# Patient Record
Sex: MALE | URBAN - METROPOLITAN AREA
[De-identification: names, ages, dates, MRNs, and addresses within clinical notes are randomized per-mention and may not be internally consistent; named-entity substitution may affect disease eponyms.]

---

## 2019-07-10 ENCOUNTER — OFFICE VISIT (OUTPATIENT)
Dept: URBAN - METROPOLITAN AREA CLINIC 32 | Facility: CLINIC | Age: 44
End: 2019-07-10
Payer: COMMERCIAL

## 2019-07-10 PROCEDURE — 99203 OFFICE O/P NEW LOW 30 MIN: CPT | Performed by: OPTOMETRIST

## 2019-07-12 ENCOUNTER — OFFICE VISIT (OUTPATIENT)
Dept: URBAN - METROPOLITAN AREA CLINIC 33 | Facility: CLINIC | Age: 44
End: 2019-07-12
Payer: COMMERCIAL

## 2019-07-12 DIAGNOSIS — H10.13 ACUTE ATOPIC CONJUNCTIVITIS, BILATERAL: Primary | ICD-10-CM

## 2019-07-12 PROCEDURE — 99213 OFFICE O/P EST LOW 20 MIN: CPT | Performed by: OPTOMETRIST

## 2019-07-12 RX ORDER — PREDNISOLONE ACETATE 10 MG/ML
1 % SUSPENSION/ DROPS OPHTHALMIC
Qty: 5 | Refills: 1 | Status: ACTIVE
Start: 2019-07-12

## 2019-07-12 ASSESSMENT — INTRAOCULAR PRESSURE
OD: 18
OS: 18

## 2019-07-12 NOTE — IMPRESSION/PLAN
Impression: Acute atopic conjunctivitis, bilateral: H10.13. OU. Plan: Severe conjunctivitis developed night after swimming with contact lenses. No signs of corneal keratitis or mucous discharge. Moderate serous discharge, scleral injection, and eyelid edema. Continue besivance QID OU and start Pred QID OU. Cold compress as needed. See 3-5 days for follow-up. STay out of CL's, throw away previous CL's.

## 2019-07-16 ENCOUNTER — OFFICE VISIT (OUTPATIENT)
Dept: URBAN - METROPOLITAN AREA CLINIC 33 | Facility: CLINIC | Age: 44
End: 2019-07-16
Payer: COMMERCIAL

## 2019-07-16 PROCEDURE — 99213 OFFICE O/P EST LOW 20 MIN: CPT | Performed by: OPTOMETRIST

## 2019-07-16 RX ORDER — OFLOXACIN 3 MG/ML
0.3 % SOLUTION/ DROPS OPHTHALMIC
Qty: 5 | Refills: 1 | Status: ACTIVE
Start: 2019-07-16

## 2019-07-16 ASSESSMENT — INTRAOCULAR PRESSURE
OS: 16
OD: 14

## 2019-07-26 ENCOUNTER — OFFICE VISIT (OUTPATIENT)
Dept: URBAN - METROPOLITAN AREA CLINIC 33 | Facility: CLINIC | Age: 44
End: 2019-07-26
Payer: COMMERCIAL

## 2019-07-26 PROCEDURE — 99213 OFFICE O/P EST LOW 20 MIN: CPT | Performed by: OPTOMETRIST

## 2019-07-26 ASSESSMENT — INTRAOCULAR PRESSURE
OD: 12
OS: 13

## 2019-07-26 NOTE — IMPRESSION/PLAN
Impression: Acute atopic conjunctivitis, bilateral: H10.13 OU. Plan: Acute Hemorrhagic conjunctivitis OU. Slow improvement to condition. Swelling has reduced and patient denies discomfort. Continue using ATs for comfort. D/C Ofloxacin and Prednisolone. RTC if condition not resolved after 3 weeks.